# Patient Record
Sex: MALE | Race: WHITE | NOT HISPANIC OR LATINO | Employment: OTHER | URBAN - METROPOLITAN AREA
[De-identification: names, ages, dates, MRNs, and addresses within clinical notes are randomized per-mention and may not be internally consistent; named-entity substitution may affect disease eponyms.]

---

## 2017-01-17 ENCOUNTER — OFFICE VISIT (OUTPATIENT)
Dept: NEUROLOGY | Facility: CLINIC | Age: 72
End: 2017-01-17
Payer: MEDICARE

## 2017-01-17 VITALS
SYSTOLIC BLOOD PRESSURE: 122 MMHG | HEIGHT: 70 IN | DIASTOLIC BLOOD PRESSURE: 78 MMHG | HEART RATE: 60 BPM | WEIGHT: 205.94 LBS | BODY MASS INDEX: 29.48 KG/M2

## 2017-01-17 DIAGNOSIS — G20.A1 PARKINSON DISEASE, SYMPTOMATIC: Primary | ICD-10-CM

## 2017-01-17 PROCEDURE — 99213 OFFICE O/P EST LOW 20 MIN: CPT | Mod: PBBFAC | Performed by: PSYCHIATRY & NEUROLOGY

## 2017-01-17 PROCEDURE — 99214 OFFICE O/P EST MOD 30 MIN: CPT | Mod: S$PBB,,, | Performed by: PSYCHIATRY & NEUROLOGY

## 2017-01-17 PROCEDURE — 99999 PR PBB SHADOW E&M-EST. PATIENT-LVL III: CPT | Mod: PBBFAC,,, | Performed by: PSYCHIATRY & NEUROLOGY

## 2017-01-17 RX ORDER — CARBIDOPA AND LEVODOPA 25; 100 MG/1; MG/1
1 TABLET ORAL 3 TIMES DAILY
Qty: 270 TABLET | Refills: 11 | Status: SHIPPED | OUTPATIENT
Start: 2017-01-17

## 2017-01-17 RX ORDER — ROPINIROLE 1 MG/1
1 TABLET, FILM COATED ORAL NIGHTLY
Qty: 90 TABLET | Refills: 3 | Status: SHIPPED | OUTPATIENT
Start: 2017-01-17 | End: 2018-01-17

## 2017-01-17 RX ORDER — ROPINIROLE 1 MG/1
1 TABLET, FILM COATED ORAL NIGHTLY
Qty: 90 TABLET | Refills: 3 | Status: SHIPPED | OUTPATIENT
Start: 2017-01-17 | End: 2017-01-17 | Stop reason: SDUPTHER

## 2017-01-17 RX ORDER — ROPINIROLE 2 MG/1
TABLET, FILM COATED ORAL
Qty: 60 TABLET | Refills: 11 | Status: SHIPPED | OUTPATIENT
Start: 2017-01-17

## 2017-01-17 RX ORDER — CARBIDOPA AND LEVODOPA 25; 100 MG/1; MG/1
1 TABLET ORAL 3 TIMES DAILY
Qty: 270 TABLET | Refills: 11 | Status: SHIPPED | OUTPATIENT
Start: 2017-01-17 | End: 2017-01-17 | Stop reason: SDUPTHER

## 2017-01-17 RX ORDER — ROPINIROLE 2 MG/1
TABLET, FILM COATED ORAL
Qty: 60 TABLET | Refills: 11 | Status: SHIPPED | OUTPATIENT
Start: 2017-01-17 | End: 2017-01-17 | Stop reason: SDUPTHER

## 2017-01-17 RX ORDER — RASAGILINE 1 MG/1
1 TABLET ORAL DAILY
Qty: 90 TABLET | Refills: 3 | Status: SHIPPED | OUTPATIENT
Start: 2017-01-17 | End: 2017-01-17 | Stop reason: SDUPTHER

## 2017-01-17 RX ORDER — RASAGILINE 1 MG/1
1 TABLET ORAL DAILY
Qty: 90 TABLET | Refills: 3 | Status: SHIPPED | OUTPATIENT
Start: 2017-01-17 | End: 2018-01-17

## 2017-01-17 NOTE — PROGRESS NOTES
This is a 71-year-old right-handed patient who has been established diagnosis of Parkinson's disease manifested by tremor and gait disturbance.  The patient's current Parkinson medications are Azilect 1 mg a day, Sinemet 25/100 3 times a day, and ropinirole 5 mg daily.    On this combination of medications, the patient has had minimal Parkinson symptoms.  He states that he will have a slight degree of stiffness when first getting up in the morning until after taking the first dose of Sinemet.  The patient has an occasional sensation of tremor but does not interfere with activities of daily living.  The patient has no difficulty with bed mobility.  He is not had any excessive sialorrhea.  He is not showing any signs of depression.  He does not have major sleep disturbance other than his long-standing history of primary insomnia.    The patient is in the process of moving and retiring to [facility].  Subsequently, he will be followed by physicians at the [facility] in that location.      ROS:  GENERAL: No fever, chills, fatigability or weight loss.  SKIN: No rashes, itching or changes in color or texture of skin.  HEAD: No headaches or recent head trauma.  EYES: Visual acuity fine. No photophobia, ocular pain or diplopia.  EARS: Denies ear pain, discharge or vertigo.  NOSE: No loss of smell, no epistaxis or postnasal drip.  MOUTH & THROAT: No hoarseness or change in voice. No excessive gum bleeding.  NODES: Denies swollen glands.  CHEST: Denies CANTU, cyanosis, wheezing, cough and sputum production.  CARDIOVASCULAR: Denies chest pain, PND, orthopnea or reduced exercise tolerance.  ABDOMEN: Appetite fine. No weight loss. Denies diarrhea, abdominal pain, hematemesis or blood in stool.  URINARY: No flank pain, dysuria or hematuria.  PERIPHERAL VASCULAR: No claudication or cyanosis.  MUSCULOSKELETAL: No joint stiffness or swelling. Denies back pain.  NEUROLOGIC: No history of seizures, paralysis, or unexplained  loss of consciousness.    The patient's past history, family history, and social history have been reviewed within the electronic medical record and with the patient.  No alterations are made to that portion of the record during this visit.    PE:   VITAL SIGNS: Blood pressure 122/78, pulse 76 and regular, weight 93.4 kg, height 5 foot 10 inches, BMI 29.54  APPEARANCE: Well nourished, well developed, in no acute distress.    HEAD: Normocephalic, atraumatic.  EYES: PERRL. EOMI.  Non-icteric sclerae.    EARS: TM's intact. Light reflex normal. No retraction or perforation.    NOSE: Mucosa pink. Airway clear.  MOUTH & THROAT: No tonsillar enlargement. No pharyngeal erythema or exudate. No stridor.  NECK: Supple. No bruits.  CHEST: Lungs clear to auscultation.  CARDIOVASCULAR: Regular rhythm without significant murmurs.  ABDOMEN: Bowel sounds normal. Not distended.   MUSCULOSKELETAL:  No bony deformity seen.    NEUROLOGIC:   Mental Status:  The patient is well oriented to person, time, place, and situation.  The patient is attentive to the environment and cooperative for the exam.  Cranial Nerves: II-XII grossly intact. Fundoscopic exam is normal.  No hemorrhage, exudate or papilledema is present. The extraocular muscles are intact in the cardinal directions of gaze.  No ptosis is present. Facial features are symmetrical.  Speech is normal in fluency, diction, and phrasing.  Tongue protrudes in the midline.    Gait and Station:  Romberg is negative.  Good alternate armswing with normal gait.  The patient takes 2 steps to make 180° turn.  I did not see any tremor during ambulation.  Motor:  No downdrift of either arm when held at shoulder level.  Manual muscle testing of proximal and distal muscles of both upper and lower extremities is normal. Muscle mass is normal in both upper and both lower extremities.  No localized areas of atrophy are seen.  Sensory:  Intact both upper and lower extremities to pin prick, touch, and  vibration.  Cerebellar:  Finger to nose done well.  I did not see any resting tremor on today's visit.  With passive range of motion at the wrist and elbow there is perceptible cogwheel rigidity.  Alternating movements are slowly performed but with good rhythmicity.  Finger tapping and toe tapping are somewhat slow, but are rhythmical.  Reflexes:  Stretch reflexes are 2+ both upper and lower extremities.  Plantar stimulation is flexor bilaterally and no pathological reflexes are seen     ASSESSMENT:  1.  Parkinson's disease, symptomatic, well controlled with current medications    RECOMMENDATIONS:  1.  Continue Sinemet, Azilect, and ropinirole as previously prescribed  2.  Future neurological care will be through the [facility] at his new home in [place], [place].  He may of course return here whenever needed.    This was a 30 minute visit with the patient with over 50% of the time spent counseling the patient regarding the symptomatically treatment of Parkinson's disease.

## 2022-05-05 ENCOUNTER — PATIENT MESSAGE (OUTPATIENT)
Dept: RESEARCH | Facility: HOSPITAL | Age: 77
End: 2022-05-05
Payer: MEDICARE